# Patient Record
Sex: FEMALE | Race: BLACK OR AFRICAN AMERICAN | NOT HISPANIC OR LATINO | Employment: STUDENT | URBAN - METROPOLITAN AREA
[De-identification: names, ages, dates, MRNs, and addresses within clinical notes are randomized per-mention and may not be internally consistent; named-entity substitution may affect disease eponyms.]

---

## 2017-03-27 ENCOUNTER — TELEPHONE (OUTPATIENT)
Dept: OBSTETRICS AND GYNECOLOGY | Facility: CLINIC | Age: 62
End: 2017-03-27

## 2017-03-27 DIAGNOSIS — Z12.31 ENCOUNTER FOR SCREENING MAMMOGRAM FOR BREAST CANCER: Primary | ICD-10-CM

## 2017-03-27 NOTE — TELEPHONE ENCOUNTER
Per patient's request she has been scheduled for a mammogram. Patient is aware of appointment date, time and location.

## 2017-03-27 NOTE — TELEPHONE ENCOUNTER
----- Message from Manisha Rust sent at 3/27/2017 12:01 PM CDT -----  [facility] order.[phone] (home)

## 2017-05-10 ENCOUNTER — OFFICE VISIT (OUTPATIENT)
Dept: OPHTHALMOLOGY | Facility: CLINIC | Age: 62
End: 2017-05-10
Payer: COMMERCIAL

## 2017-05-10 DIAGNOSIS — H18.519 FUCHS' CORNEAL DYSTROPHY: ICD-10-CM

## 2017-05-10 DIAGNOSIS — H18.053 KRUKENBERG SPINDLE, BILATERAL: ICD-10-CM

## 2017-05-10 DIAGNOSIS — Z13.5 GLAUCOMA SCREENING: ICD-10-CM

## 2017-05-10 DIAGNOSIS — H52.7 REFRACTIVE ERROR: ICD-10-CM

## 2017-05-10 DIAGNOSIS — H40.003 GLAUCOMA SUSPECT, BILATERAL: Primary | ICD-10-CM

## 2017-05-10 PROCEDURE — 92015 DETERMINE REFRACTIVE STATE: CPT | Mod: S$GLB,,, | Performed by: OPTOMETRIST

## 2017-05-10 PROCEDURE — 92014 COMPRE OPH EXAM EST PT 1/>: CPT | Mod: S$GLB,,, | Performed by: OPTOMETRIST

## 2017-05-10 PROCEDURE — 99999 PR PBB SHADOW E&M-EST. PATIENT-LVL II: CPT | Mod: PBBFAC,,, | Performed by: OPTOMETRIST

## 2017-05-10 NOTE — PROGRESS NOTES
HPI     Last [facility] exam 04/25/2016  Glaucoma Suspect, Bilateral  Krukenberg spindle, bilateral  Fuchs' corneal dystrophy       Last edited by Ben Chowdhury MA on 5/10/2017  1:36 PM.         Assessment /Plan     For exam results, see Encounter Report.    Glaucoma suspect, bilateral    Fuchs' corneal dystrophy    Krukenberg spindle, bilateral    Cataract, nuclear, bilateral    Glaucoma screening    Refractive error      COAG suspect due to K-spindle and Fuch's and borderline IOP without change today = will follow. Mild NS OU = discussed and will follow.  OH OK OU otherwise.  Spec Rx given.  RTC one year.

## 2017-05-10 NOTE — MR AVS SNAPSHOT
[facility]  [address] [address] [place] [address]  Phone: [phone]  Fax: [phone]                  Caro Purvis   5/10/2017 1:15 PM   Office Visit    Description:  Female : 1955   Provider:  IDALIA Grady OD   Department:  [facility]           Reason for Visit     Hypertensive Eye Exam           Diagnoses this Visit        Comments    Glaucoma suspect, bilateral    -  Primary     Fuchs' corneal dystrophy         Krukenberg spindle, bilateral         Cataract, nuclear, bilateral         Glaucoma screening         Refractive error                To Do List           Future Appointments        Provider Department Dept Phone    2017 10:45 AM SUMNILESH [id number] [facility] [phone]    2017 11:15 AM Rahel Gamble MD [facility]/ [phone]      Goals (5 Years of Data)     None      Follow-Up and Disposition     Return in about 1 year (around 5/10/2018).      [facility]     [facility] -  Assistance  Unless otherwise directed by your provider, please contact [facility], our nurse care line that is available for  assistance.     Registered nurses in the [facility] provide: appointment scheduling, clinical advisement, health education, and other advisory services.  Call: [phone] (toll free)               Medications           Message regarding Medications     Verify the changes and/or additions to your medication regime listed below are the same as discussed with your clinician today.  If any of these changes or additions are incorrect, please notify your healthcare provider.             Verify that the below list of medications is an accurate representation of the medications you are currently taking.  If none reported, the list may be blank. If incorrect, please contact your healthcare provider. Carry this list with you in case of emergency.           Current Medications      amlodipine (NORVASC) 10 MG tablet Take 1 tablet (10 mg total) by mouth once daily.    cloNIDine (CATAPRES) 0.3 MG tablet Take 1 tablet (0.3 mg total) by mouth every evening.    furosemide (LASIX) 40 MG tablet Take 1 tablet (40 mg total) by mouth once daily.    hydrocortisone valerate (WESTCORT) 0.2 % ointment Apply 1 applicator topically Twice daily as needed. AAA bid       lisinopril (PRINIVIL,ZESTRIL) 40 MG tablet Take 1 tablet (40 mg total) by mouth once daily.    multivitamin-iron-folic acid (CENTRUM ULTRA WOMEN'S)  mg-mcg Tab Take 1 tablet by mouth Daily.    potassium chloride SA (K-DUR,KLOR-CON) 20 MEQ tablet Take 1 tablet by mouth  daily    pravastatin (PRAVACHOL) 40 MG tablet Take 1 tablet (40 mg total) by mouth once daily.           Clinical Reference Information           Your Vitals Were     Last Period                   07/15/2013           Allergies as of 5/10/2017     No Known Allergies      Immunizations Administered on Date of Encounter - 5/10/2017     None      Language Assistance Services     ATTENTION: Language assistance services are available, free of charge. Please call [phone].      ATENCIÓN: Si habla kasi, tiene a marmolejo disposición servicios gratuitos de asistencia lingüística. Llame al [phone].     [facility] Ý: N?u b?n nói Ti?ng Vi?t, có các d?ch v? h? tr? ngôn ng? mi?n phí dành cho b?n. G?i s? [phone].         [facility] complies with applicable Federal civil rights laws and does not discriminate on the basis of race, color, national origin, age, disability, or sex.

## 2017-05-16 DIAGNOSIS — I10 ESSENTIAL HYPERTENSION: ICD-10-CM

## 2017-05-16 RX ORDER — AMLODIPINE BESYLATE 10 MG/1
TABLET ORAL
Qty: 30 TABLET | Refills: 5 | Status: SHIPPED | OUTPATIENT
Start: 2017-05-16 | End: 2017-11-27 | Stop reason: SDUPTHER

## 2017-05-18 ENCOUNTER — HOSPITAL ENCOUNTER (OUTPATIENT)
Dept: RADIOLOGY | Facility: HOSPITAL | Age: 62
Discharge: HOME | End: 2017-05-18
Attending: OBSTETRICS & GYNECOLOGY
Payer: COMMERCIAL

## 2017-05-18 ENCOUNTER — OFFICE VISIT (OUTPATIENT)
Dept: OBSTETRICS AND GYNECOLOGY | Facility: CLINIC | Age: 62
End: 2017-05-18
Payer: COMMERCIAL

## 2017-05-18 VITALS
SYSTOLIC BLOOD PRESSURE: 150 MMHG | BODY MASS INDEX: 36.37 KG/M2 | DIASTOLIC BLOOD PRESSURE: 90 MMHG | WEIGHT: 240 LBS | HEIGHT: 68 IN

## 2017-05-18 DIAGNOSIS — Z12.31 ENCOUNTER FOR SCREENING MAMMOGRAM FOR BREAST CANCER: ICD-10-CM

## 2017-05-18 DIAGNOSIS — Z01.419 WELL WOMAN EXAM WITH ROUTINE GYNECOLOGICAL EXAM: Primary | ICD-10-CM

## 2017-05-18 PROCEDURE — 77067 SCR MAMMO BI INCL CAD: CPT | Mod: TC

## 2017-05-18 PROCEDURE — 3077F SYST BP >= 140 MM HG: CPT | Mod: S$GLB,,, | Performed by: OBSTETRICS & GYNECOLOGY

## 2017-05-18 PROCEDURE — 3080F DIAST BP >= 90 MM HG: CPT | Mod: S$GLB,,, | Performed by: OBSTETRICS & GYNECOLOGY

## 2017-05-18 PROCEDURE — 88142 CYTOPATH C/V THIN LAYER: CPT

## 2017-05-18 PROCEDURE — 99999 PR PBB SHADOW E&M-EST. PATIENT-LVL II: CPT | Mod: PBBFAC,,, | Performed by: OBSTETRICS & GYNECOLOGY

## 2017-05-18 PROCEDURE — 99396 PREV VISIT EST AGE 40-64: CPT | Mod: S$GLB,,, | Performed by: OBSTETRICS & GYNECOLOGY

## 2017-05-18 PROCEDURE — 77067 SCR MAMMO BI INCL CAD: CPT | Mod: 26,,, | Performed by: RADIOLOGY

## 2017-05-18 RX ORDER — NEBIVOLOL HYDROCHLORIDE 20 MG/1
TABLET ORAL
COMMUNITY
Start: 2017-04-19 | End: 2018-12-13

## 2017-05-18 NOTE — PROGRESS NOTES
CHIEF COMPLAINT:   Gynecologic Exam  Chief Complaint   Patient presents with    Gynecologic Exam       HISTORY OF PRESENT ILLNESS  Patient presents for annual exam. The patient has no complaints today.     Patient's last menstrual period was 07/15/2013.  Postmenopausal    GYN screening history: last Pap: was normal. Never had any abnormal Pap smears in past     Reports no bowel movement irregularities from baseline, bloating, or weight loss.    HRT:   None        Health Maintenance   Topic Date Due    TETANUS VACCINE  10/29/1973    Zoster Vaccine  10/29/2015    Mammogram  11/12/2016    Influenza Vaccine  08/01/2017    Pap Smear  08/20/2017    Lipid Panel  06/29/2020    Colonoscopy  10/03/2024    Hepatitis C Screening  Completed       HISTORY  Patient Active Problem List   Diagnosis    Obesity    Osteoarthritis    Hyperlipemia    Colon cancer screening    Essential hypertension       Past Medical History:   Diagnosis Date    Essential hypertension, benign     Obesity     Other and unspecified hyperlipidemia     Uveitis        Past Surgical History:   Procedure Laterality Date    COLONOSCOPY      TUBAL LIGATION         Family History   Problem Relation Age of Onset    Hypertension Mother     Hypertension Father     Cataracts Father     Hypertension Sister     Hypertension Brother     Hypertension Sister     Hypertension Brother     Hypertension Brother     Hypertension Brother     Hypertension Brother     Breast cancer Neg Hx     Ovarian cancer Neg Hx     Deep vein thrombosis Neg Hx        Social History     Social History    Marital status:      Spouse name: Jerardo    Number of children: 4    Years of education: N/A     Occupational History    CNA/Student      Social History Main Topics    Smoking status: Never Smoker    Smokeless tobacco: Never Used    Alcohol use No    Drug use: No    Sexual activity: Yes     Partners: Male      Comment:      Other Topics Concern     None     Social History Narrative       Current Outpatient Prescriptions   Medication Sig Dispense Refill    amlodipine (NORVASC) 10 MG tablet TAKE ONE TABLET BY MOUTH ONCE DAILY 30 tablet 5    cloNIDine (CATAPRES) 0.3 MG tablet Take 1 tablet (0.3 mg total) by mouth every evening. 90 tablet 3    furosemide (LASIX) 40 MG tablet Take 1 tablet (40 mg total) by mouth once daily. 90 tablet 3    hydrocortisone valerate (WESTCORT) 0.2 % ointment Apply 1 applicator topically Twice daily as needed. AAA bid         lisinopril (PRINIVIL,ZESTRIL) 40 MG tablet Take 1 tablet (40 mg total) by mouth once daily. 90 tablet 3    multivitamin-iron-folic acid (CENTRUM ULTRA WOMEN'S)  mg-mcg Tab Take 1 tablet by mouth Daily.      potassium chloride SA (K-DUR,KLOR-CON) 20 MEQ tablet Take 1 tablet by mouth  daily 90 tablet 1    pravastatin (PRAVACHOL) 40 MG tablet Take 1 tablet (40 mg total) by mouth once daily. 90 tablet 3    BYSTOLIC 20 mg Tab        No current facility-administered medications for this visit.        Review of patient's allergies indicates:  No Known Allergies        PHYSICAL EXAM     Vitals:    05/18/17 1119   BP: (!) 150/90     Repeat 140/88    PAIN SCALE: 0/10 None    ROS:  GENERAL: No fever, chills, fatigability or weight loss.  ABDOMEN: Appetite fine. No weight loss. Denies diarrhea, abdominal pain, hematemesis or blood in stool.  No change in bowel movement pattern.  URINARY: No flank pain, dysuria or hematuria.  REPRODUCTIVE: No abnormal vaginal bleeding.  BREASTS: Breasts symmetric, nontender and no lumps detected.    PE:   APPEARANCE: Well nourished, well developed, in no acute distress.  NECK: Neck symmetric without masses or thyromegaly.    NODES: No inguinal lymph node enlargement.  ABDOMEN: Soft. No tenderness or masses. No hepatosplenomegaly. No hernias.  BREASTS: Symmetrical, no skin changes or visible lesions. No palpable masses, nipple discharge or adenopathy bilaterally.    PELVIC:    VULVA: No lesions.  Atrophic but normal female genitalia.  URETHRAL MEATUS: Normal size and location, no lesions, no prolapse.  URETHRA: No masses, tenderness, prolapse or scarring.  VAGINA: dry and narrow w/ atrophy, no discharge, no significant cystocele or rectocele.  CERVIX: No lesions, normal diameter, no stenosis, no cervical motion tenderness.  UTERUS: 8 week size, regular shape, mobile, non-tender, normal position, good support.  ADNEXA: No masses, tenderness or CDS nodularity.  ANUS PERINEUM: No lesions, no relaxation, external hemorrhoids noted.         DIAGNOSIS:   1. Normal gyn exam  2. Screening pap smear  3. Physiologic atrophy    PLAN:   Mammogram    Colonoscopy  dexa     MEDICATIONS PRESCRIBED:   Calcium vitamin D and weight bearing exercise    COUNSELING:  Patient was counseled today on A.C.S. Pap guidelines and recommendations for yearly pelvic exams, mammograms and monthly self breast exams; to see her PCP for other health maintenance. F/u pcp for bp.    FOLLOW-UP: With me in 1 year.

## 2017-08-10 DIAGNOSIS — E78.5 HYPERLIPEMIA: ICD-10-CM

## 2017-08-10 RX ORDER — PRAVASTATIN SODIUM 40 MG/1
TABLET ORAL
Qty: 30 TABLET | Refills: 11 | Status: SHIPPED | OUTPATIENT
Start: 2017-08-10 | End: 2018-10-14 | Stop reason: SDUPTHER

## 2017-11-27 DIAGNOSIS — I10 ESSENTIAL HYPERTENSION: ICD-10-CM

## 2017-11-28 RX ORDER — AMLODIPINE BESYLATE 10 MG/1
TABLET ORAL
Qty: 30 TABLET | Refills: 5 | Status: SHIPPED | OUTPATIENT
Start: 2017-11-28 | End: 2018-06-03 | Stop reason: SDUPTHER

## 2018-06-03 DIAGNOSIS — I10 ESSENTIAL HYPERTENSION: ICD-10-CM

## 2018-06-04 RX ORDER — AMLODIPINE BESYLATE 10 MG/1
TABLET ORAL
Qty: 30 TABLET | Refills: 5 | Status: SHIPPED | OUTPATIENT
Start: 2018-06-04 | End: 2018-09-26 | Stop reason: SDUPTHER

## 2018-08-13 ENCOUNTER — TELEPHONE (OUTPATIENT)
Dept: OBSTETRICS AND GYNECOLOGY | Facility: CLINIC | Age: 63
End: 2018-08-13

## 2018-08-13 DIAGNOSIS — Z12.39 BREAST CANCER SCREENING: Primary | ICD-10-CM

## 2018-08-13 NOTE — TELEPHONE ENCOUNTER
Spoke with pt. Scheduled mammo for same day as annual with Dr. Gamble at the [facility] location. Pt verbalized understanding.

## 2018-08-13 NOTE — TELEPHONE ENCOUNTER
----- Message from Soheila Casey sent at 8/13/2018  4:46 PM CDT -----  Contact: pt  Pt stated she calling for orders for a mammogram, she can be reached at [phone]

## 2018-09-26 DIAGNOSIS — I10 ESSENTIAL HYPERTENSION: ICD-10-CM

## 2018-09-26 RX ORDER — AMLODIPINE BESYLATE 10 MG/1
TABLET ORAL
Qty: 90 TABLET | Refills: 1 | Status: SHIPPED | OUTPATIENT
Start: 2018-09-26 | End: 2019-03-30 | Stop reason: SDUPTHER

## 2018-10-14 DIAGNOSIS — E78.5 HYPERLIPEMIA: ICD-10-CM

## 2018-10-15 RX ORDER — PRAVASTATIN SODIUM 40 MG/1
TABLET ORAL
Qty: 30 TABLET | Refills: 11 | Status: SHIPPED | OUTPATIENT
Start: 2018-10-15

## 2018-10-31 ENCOUNTER — HOSPITAL ENCOUNTER (OUTPATIENT)
Dept: RADIOLOGY | Facility: HOSPITAL | Age: 63
Discharge: HOME | End: 2018-10-31
Attending: OBSTETRICS & GYNECOLOGY
Payer: COMMERCIAL

## 2018-10-31 VITALS — BODY MASS INDEX: 36.37 KG/M2 | WEIGHT: 240 LBS | HEIGHT: 68 IN

## 2018-10-31 DIAGNOSIS — Z12.39 BREAST CANCER SCREENING: ICD-10-CM

## 2018-10-31 PROCEDURE — 77063 BREAST TOMOSYNTHESIS BI: CPT | Mod: 26,,, | Performed by: RADIOLOGY

## 2018-10-31 PROCEDURE — 77063 BREAST TOMOSYNTHESIS BI: CPT | Mod: TC,PO

## 2018-10-31 PROCEDURE — 77067 SCR MAMMO BI INCL CAD: CPT | Mod: 26,,, | Performed by: RADIOLOGY

## 2018-12-13 ENCOUNTER — OFFICE VISIT (OUTPATIENT)
Dept: OBSTETRICS AND GYNECOLOGY | Facility: CLINIC | Age: 63
End: 2018-12-13
Payer: COMMERCIAL

## 2018-12-13 VITALS
DIASTOLIC BLOOD PRESSURE: 76 MMHG | BODY MASS INDEX: 34.15 KG/M2 | HEIGHT: 68 IN | WEIGHT: 225.31 LBS | SYSTOLIC BLOOD PRESSURE: 156 MMHG

## 2018-12-13 DIAGNOSIS — Z01.419 WELL WOMAN EXAM WITH ROUTINE GYNECOLOGICAL EXAM: Primary | ICD-10-CM

## 2018-12-13 PROCEDURE — 99396 PREV VISIT EST AGE 40-64: CPT | Mod: S$GLB,,, | Performed by: OBSTETRICS & GYNECOLOGY

## 2018-12-13 PROCEDURE — 99999 PR PBB SHADOW E&M-EST. PATIENT-LVL II: CPT | Mod: PBBFAC,,, | Performed by: OBSTETRICS & GYNECOLOGY

## 2018-12-13 PROCEDURE — 3078F DIAST BP <80 MM HG: CPT | Mod: CPTII,S$GLB,, | Performed by: OBSTETRICS & GYNECOLOGY

## 2018-12-13 PROCEDURE — 3077F SYST BP >= 140 MM HG: CPT | Mod: CPTII,S$GLB,, | Performed by: OBSTETRICS & GYNECOLOGY

## 2018-12-13 RX ORDER — METFORMIN HYDROCHLORIDE 500 MG/1
500 TABLET ORAL 2 TIMES DAILY WITH MEALS
COMMUNITY

## 2018-12-13 NOTE — PROGRESS NOTES
CHIEF COMPLAINT:   Gynecologic Exam  Chief Complaint   Patient presents with    Well Woman       HISTORY OF PRESENT ILLNESS  Patient presents for annual exam. The patient has no complaints today.     Patient's last menstrual period was 07/15/2013.  Postmenopausal    GYN screening history: last Pap: was normal. Never had any abnormal Pap smears in past.     Reports no bowel movement irregularities from baseline, bloating, or weight loss.    HRT:   None         Health Maintenance   Topic Date Due    TETANUS VACCINE  10/29/1973    Zoster Vaccine  10/29/2015    Influenza Vaccine  08/01/2018    Mammogram  10/31/2019    Pap Smear with HPV Cotest  05/18/2020    Lipid Panel  06/29/2020    Colonoscopy  10/03/2024    Hepatitis C Screening  Completed       HISTORY  Patient Active Problem List   Diagnosis    Obesity    Osteoarthritis    Hyperlipemia    Colon cancer screening    Essential hypertension       Past Medical History:   Diagnosis Date    Essential hypertension, benign     Obesity     Other and unspecified hyperlipidemia     Uveitis        Past Surgical History:   Procedure Laterality Date    COLONOSCOPY      COLONOSCOPY N/A 10/3/2014    [facility]    TUBAL LIGATION         Family History   Problem Relation Age of Onset    Hypertension Mother     Hypertension Father     Cataracts Father     Hypertension Sister     Hypertension Brother     Hypertension Sister     Hypertension Brother     Hypertension Brother     Hypertension Brother     Hypertension Brother     Breast cancer Neg Hx     Ovarian cancer Neg Hx     Deep vein thrombosis Neg Hx        Social History     Socioeconomic History    Marital status:      Spouse name: Jerardo    Number of children: 4    Years of education: None    Highest education level: None   Social Needs    Financial resource strain: None    Food insecurity - worry: None    Food insecurity - inability: None     Transportation needs - medical: None    Transportation needs - non-medical: None   Occupational History    Occupation: CNA/Student   Tobacco Use    Smoking status: Never Smoker    Smokeless tobacco: Never Used   Substance and Sexual Activity    Alcohol use: No    Drug use: No    Sexual activity: No     Partners: Male     Comment:    Other Topics Concern    None   Social History Narrative    None       Current Outpatient Medications   Medication Sig Dispense Refill    amLODIPine (NORVASC) 10 MG tablet TAKE 1 TABLET BY MOUTH ONCE DAILY 90 tablet 1    cloNIDine (CATAPRES) 0.3 MG tablet Take 1 tablet (0.3 mg total) by mouth every evening. 90 tablet 3    furosemide (LASIX) 40 MG tablet Take 1 tablet (40 mg total) by mouth once daily. 90 tablet 3    hydrocortisone valerate (WESTCORT) 0.2 % ointment Apply 1 applicator topically Twice daily as needed. AAA bid         lisinopril (PRINIVIL,ZESTRIL) 40 MG tablet Take 1 tablet (40 mg total) by mouth once daily. 90 tablet 3    metFORMIN (GLUCOPHAGE) 500 MG tablet Take 500 mg by mouth 2 (two) times daily with meals.      multivitamin-iron-folic acid (CENTRUM ULTRA WOMEN'S)  mg-mcg Tab Take 1 tablet by mouth Daily.      potassium chloride SA (K-DUR,KLOR-CON) 20 MEQ tablet Take 1 tablet by mouth  daily 90 tablet 1    pravastatin (PRAVACHOL) 40 MG tablet TAKE ONE TABLET BY MOUTH ONCE DAILY 30 tablet 11     No current facility-administered medications for this visit.        Review of patient's allergies indicates:  No Known Allergies        PHYSICAL EXAM     Vitals:    12/13/18 1045   BP: (!) 156/76   repeat 144/80    PAIN SCALE: 0/10 None    ROS:  GENERAL: No fever, chills, fatigability or weight loss.  ABDOMEN: Appetite fine. No weight loss. Denies diarrhea, abdominal pain, hematemesis or blood in stool.  No change in bowel movement pattern.  URINARY: No flank pain, dysuria or hematuria.  REPRODUCTIVE: No abnormal vaginal bleeding.  BREASTS: Breasts  symmetric, nontender and no lumps detected.    PE:   APPEARANCE: Well nourished, well developed, in no acute distress.  NECK: Neck symmetric without masses or thyromegaly.   NODES: No inguinal lymph node enlargement.  ABDOMEN: Soft. No tenderness or masses. No hepatosplenomegaly. No hernias.  BREASTS: Symmetrical, no skin changes or visible lesions. No palpable masses, nipple discharge or adenopathy bilaterally.    PELVIC:   VULVA: No lesions.  Atrophic but normal female genitalia.  URETHRAL MEATUS: Normal size and location, no lesions, no prolapse.  URETHRA: No masses, tenderness, prolapse or scarring.  VAGINA: dry and narrow w/ atrophy, no discharge, no significant cystocele or rectocele.  CERVIX: No lesions, normal diameter, no stenosis, no cervical motion tenderness.  UTERUS: 8 week size, regular shape, mobile, non-tender, normal position, good support.  ADNEXA: No masses, tenderness or CDS nodularity.  ANUS PERINEUM: No lesions, no relaxation, external hemorrhoids noted.       DIAGNOSIS:   1. Normal gyn exam  2. Physiologic atrophy    PLAN:   Mammogram    Colonoscopy  dexa       MEDICATIONS PRESCRIBED:   Calcium vitamin D and weight bearing exercise    COUNSELING:  Patient was counseled today on A.C.S. Pap guidelines and recommendations for yearly pelvic exams, mammograms and monthly self breast exams; to see her PCP for other health maintenance.     FOLLOW-UP: With me in 1 year.

## 2019-03-30 DIAGNOSIS — I10 ESSENTIAL HYPERTENSION: ICD-10-CM

## 2019-04-01 RX ORDER — AMLODIPINE BESYLATE 10 MG/1
TABLET ORAL
Qty: 90 TABLET | Refills: 3 | Status: SHIPPED | OUTPATIENT
Start: 2019-04-01 | End: 2020-03-11

## 2019-12-09 ENCOUNTER — TELEPHONE (OUTPATIENT)
Dept: OBSTETRICS AND GYNECOLOGY | Facility: CLINIC | Age: 64
End: 2019-12-09

## 2019-12-09 DIAGNOSIS — Z12.31 BREAST CANCER SCREENING BY MAMMOGRAM: Primary | ICD-10-CM

## 2019-12-09 NOTE — TELEPHONE ENCOUNTER
----- Message from Lazara Chowdhury sent at 12/9/2019 11:48 AM CST -----  Contact: pt   Type:  Mammogram    Caller is requesting to schedule their annual mammogram appointment.  Order is not listed in EPIC.  Please enter order and contact patient to schedule.  Name of Caller:Caro Purvis   Where would they like the mammogram performed? [facility]   Would the patient rather a call back or a response via [facility]? Call back   Best Call Back Number: [phone] (home)   Additional Information:

## 2019-12-09 NOTE — TELEPHONE ENCOUNTER
Returned call to patient.  She requested a mammo and an annual appt at [facility].  Appts scheduled 01/16/20 at 7:20 am for mammo and 7:45 am for annual.  She confirmed appts, location and mammo instructions.  Says that she will arrive for 7:00 am for mammo, per patient.

## 2020-01-16 ENCOUNTER — HOSPITAL ENCOUNTER (OUTPATIENT)
Dept: RADIOLOGY | Facility: HOSPITAL | Age: 65
Discharge: HOME | End: 2020-01-16
Attending: OBSTETRICS & GYNECOLOGY
Payer: COMMERCIAL

## 2020-01-16 VITALS — BODY MASS INDEX: 34.15 KG/M2 | HEIGHT: 68 IN | WEIGHT: 225.31 LBS

## 2020-01-16 DIAGNOSIS — Z12.31 BREAST CANCER SCREENING BY MAMMOGRAM: ICD-10-CM

## 2020-01-16 PROCEDURE — 77067 MAMMO DIGITAL SCREENING BILAT WITH TOMOSYNTHESIS_CAD: ICD-10-PCS | Mod: 26,,, | Performed by: RADIOLOGY

## 2020-01-16 PROCEDURE — 77063 MAMMO DIGITAL SCREENING BILAT WITH TOMOSYNTHESIS_CAD: ICD-10-PCS | Mod: 26,,, | Performed by: RADIOLOGY

## 2020-01-16 PROCEDURE — 77067 SCR MAMMO BI INCL CAD: CPT | Mod: TC

## 2020-01-16 PROCEDURE — 77067 SCR MAMMO BI INCL CAD: CPT | Mod: 26,,, | Performed by: RADIOLOGY

## 2020-01-16 PROCEDURE — 77063 BREAST TOMOSYNTHESIS BI: CPT | Mod: 26,,, | Performed by: RADIOLOGY

## 2020-03-10 DIAGNOSIS — I10 ESSENTIAL HYPERTENSION: ICD-10-CM

## 2020-03-11 RX ORDER — AMLODIPINE BESYLATE 10 MG/1
TABLET ORAL
Qty: 90 TABLET | Refills: 3 | Status: SHIPPED | OUTPATIENT
Start: 2020-03-11 | End: 2021-03-14 | Stop reason: SDUPTHER

## 2020-11-17 ENCOUNTER — TELEPHONE (OUTPATIENT)
Dept: OBSTETRICS AND GYNECOLOGY | Facility: CLINIC | Age: 65
End: 2020-11-17

## 2020-11-17 NOTE — TELEPHONE ENCOUNTER
----- Message from Idalia Thompson sent at 11/17/2020  1:15 PM CST -----  Regarding: sooner appt  Contact: pt  Type:  Sooner Apoointment Request    Caller is requesting a sooner appointment.  Caller declined first available appointment listed below.  Caller will not accept being placed on the waitlist and is requesting a message be sent to doctor.  Name of Caller : pt   When is the first available appointment?12/24/2020  Symptoms:n/a  Would the patient rather a call back or a response via [facility]? Call back  Best Call Back Number:[phone]  Additional Information: annual

## 2021-01-14 ENCOUNTER — OFFICE VISIT (OUTPATIENT)
Dept: OBSTETRICS AND GYNECOLOGY | Facility: CLINIC | Age: 66
End: 2021-01-14
Payer: MEDICAID

## 2021-01-14 VITALS
BODY MASS INDEX: 32.84 KG/M2 | HEIGHT: 68 IN | DIASTOLIC BLOOD PRESSURE: 78 MMHG | SYSTOLIC BLOOD PRESSURE: 144 MMHG | WEIGHT: 216.69 LBS

## 2021-01-14 DIAGNOSIS — Z12.31 BREAST CANCER SCREENING BY MAMMOGRAM: Primary | ICD-10-CM

## 2021-01-14 DIAGNOSIS — Z01.419 WELL WOMAN EXAM WITH ROUTINE GYNECOLOGICAL EXAM: ICD-10-CM

## 2021-01-14 PROCEDURE — 87624 HPV HI-RISK TYP POOLED RSLT: CPT

## 2021-01-14 PROCEDURE — 88141 PR  CYTOPATH CERV/VAG INTERPRET: ICD-10-PCS | Mod: ,,, | Performed by: PATHOLOGY

## 2021-01-14 PROCEDURE — 99999 PR PBB SHADOW E&M-EST. PATIENT-LVL IV: CPT | Mod: PBBFAC,,, | Performed by: OBSTETRICS & GYNECOLOGY

## 2021-01-14 PROCEDURE — 99397 PER PM REEVAL EST PAT 65+ YR: CPT | Mod: S$PBB,,, | Performed by: OBSTETRICS & GYNECOLOGY

## 2021-01-14 PROCEDURE — 99214 OFFICE O/P EST MOD 30 MIN: CPT | Mod: PBBFAC | Performed by: OBSTETRICS & GYNECOLOGY

## 2021-01-14 PROCEDURE — 99999 PR PBB SHADOW E&M-EST. PATIENT-LVL IV: ICD-10-PCS | Mod: PBBFAC,,, | Performed by: OBSTETRICS & GYNECOLOGY

## 2021-01-14 PROCEDURE — 88141 CYTOPATH C/V INTERPRET: CPT | Mod: ,,, | Performed by: PATHOLOGY

## 2021-01-14 PROCEDURE — 88175 CYTOPATH C/V AUTO FLUID REDO: CPT | Performed by: PATHOLOGY

## 2021-01-14 PROCEDURE — 99397 PR PREVENTIVE VISIT,EST,65 & OVER: ICD-10-PCS | Mod: S$PBB,,, | Performed by: OBSTETRICS & GYNECOLOGY

## 2021-01-14 RX ORDER — BRIMONIDINE TARTRATE, TIMOLOL MALEATE 2; 5 MG/ML; MG/ML
SOLUTION/ DROPS OPHTHALMIC
COMMUNITY
Start: 2020-12-20

## 2021-01-14 RX ORDER — CYCLOSPORINE 0.5 MG/ML
EMULSION OPHTHALMIC
COMMUNITY
Start: 2020-12-17

## 2021-01-14 RX ORDER — LEVOCETIRIZINE DIHYDROCHLORIDE 5 MG/1
5 TABLET, FILM COATED ORAL DAILY
COMMUNITY
Start: 2020-12-14

## 2021-01-14 RX ORDER — NAPROXEN SODIUM 220 MG/1
TABLET, FILM COATED ORAL
COMMUNITY

## 2021-01-14 RX ORDER — METOPROLOL SUCCINATE 25 MG/1
25 TABLET, EXTENDED RELEASE ORAL DAILY
COMMUNITY
Start: 2020-11-16

## 2021-01-14 RX ORDER — ERGOCALCIFEROL 1.25 MG/1
50000 CAPSULE ORAL
COMMUNITY
Start: 2020-12-20

## 2021-01-14 RX ORDER — FLUTICASONE PROPIONATE 50 MCG
SPRAY, SUSPENSION (ML) NASAL
COMMUNITY

## 2021-01-25 LAB
HPV HR 12 DNA SPEC QL NAA+PROBE: NEGATIVE
HPV16 AG SPEC QL: NEGATIVE
HPV18 DNA SPEC QL NAA+PROBE: NEGATIVE

## 2021-01-28 ENCOUNTER — HOSPITAL ENCOUNTER (OUTPATIENT)
Dept: RADIOLOGY | Facility: HOSPITAL | Age: 66
Discharge: HOME | End: 2021-01-28
Attending: OBSTETRICS & GYNECOLOGY
Payer: MEDICAID

## 2021-01-28 DIAGNOSIS — Z12.31 BREAST CANCER SCREENING BY MAMMOGRAM: ICD-10-CM

## 2021-01-28 PROCEDURE — 77063 MAMMO DIGITAL SCREENING BILAT WITH TOMO: ICD-10-PCS | Mod: 26,,, | Performed by: RADIOLOGY

## 2021-01-28 PROCEDURE — 77067 MAMMO DIGITAL SCREENING BILAT WITH TOMO: ICD-10-PCS | Mod: 26,,, | Performed by: RADIOLOGY

## 2021-01-28 PROCEDURE — 77067 SCR MAMMO BI INCL CAD: CPT | Mod: TC

## 2021-01-28 PROCEDURE — 77063 BREAST TOMOSYNTHESIS BI: CPT | Mod: 26,,, | Performed by: RADIOLOGY

## 2021-01-28 PROCEDURE — 77067 SCR MAMMO BI INCL CAD: CPT | Mod: 26,,, | Performed by: RADIOLOGY

## 2021-02-11 LAB
FINAL PATHOLOGIC DIAGNOSIS: NORMAL
Lab: NORMAL

## 2021-03-14 DIAGNOSIS — I10 ESSENTIAL HYPERTENSION: ICD-10-CM

## 2021-03-14 RX ORDER — AMLODIPINE BESYLATE 10 MG/1
TABLET ORAL
Qty: 90 TABLET | Refills: 0 | OUTPATIENT
Start: 2021-03-14

## 2021-03-14 RX ORDER — AMLODIPINE BESYLATE 10 MG/1
10 TABLET ORAL DAILY
Qty: 30 TABLET | Refills: 0 | Status: SHIPPED | OUTPATIENT
Start: 2021-03-14 | End: 2021-04-09

## 2021-03-16 DIAGNOSIS — Z76.89 ESTABLISHING CARE WITH NEW DOCTOR, ENCOUNTER FOR: Primary | ICD-10-CM

## 2021-03-16 DIAGNOSIS — E78.5 HYPERLIPIDEMIA, UNSPECIFIED HYPERLIPIDEMIA TYPE: ICD-10-CM

## 2021-03-16 DIAGNOSIS — I10 ESSENTIAL HYPERTENSION: ICD-10-CM

## 2021-03-25 ENCOUNTER — OFFICE VISIT (OUTPATIENT)
Dept: CARDIOLOGY | Facility: CLINIC | Age: 66
End: 2021-03-25
Payer: MEDICAID

## 2021-03-25 ENCOUNTER — HOSPITAL ENCOUNTER (OUTPATIENT)
Dept: CARDIOLOGY | Facility: HOSPITAL | Age: 66
Discharge: HOME | End: 2021-03-25
Attending: INTERNAL MEDICINE
Payer: MEDICAID

## 2021-03-25 VITALS
DIASTOLIC BLOOD PRESSURE: 86 MMHG | HEART RATE: 70 BPM | BODY MASS INDEX: 32.84 KG/M2 | SYSTOLIC BLOOD PRESSURE: 150 MMHG | WEIGHT: 216 LBS | OXYGEN SATURATION: 99 %

## 2021-03-25 DIAGNOSIS — I10 ESSENTIAL HYPERTENSION: ICD-10-CM

## 2021-03-25 DIAGNOSIS — E78.5 HYPERLIPIDEMIA, UNSPECIFIED HYPERLIPIDEMIA TYPE: ICD-10-CM

## 2021-03-25 DIAGNOSIS — I73.9 PVD (PERIPHERAL VASCULAR DISEASE): ICD-10-CM

## 2021-03-25 DIAGNOSIS — E78.2 MIXED HYPERLIPIDEMIA: ICD-10-CM

## 2021-03-25 DIAGNOSIS — I10 ESSENTIAL HYPERTENSION: Primary | ICD-10-CM

## 2021-03-25 DIAGNOSIS — I51.7 LVH (LEFT VENTRICULAR HYPERTROPHY): ICD-10-CM

## 2021-03-25 DIAGNOSIS — Z76.89 ESTABLISHING CARE WITH NEW DOCTOR, ENCOUNTER FOR: ICD-10-CM

## 2021-03-25 PROCEDURE — 93005 ELECTROCARDIOGRAM TRACING: CPT

## 2021-03-25 PROCEDURE — 93010 EKG 12-LEAD: ICD-10-PCS | Mod: ,,, | Performed by: INTERNAL MEDICINE

## 2021-03-25 PROCEDURE — 93010 ELECTROCARDIOGRAM REPORT: CPT | Mod: ,,, | Performed by: INTERNAL MEDICINE

## 2021-03-25 PROCEDURE — 99213 OFFICE O/P EST LOW 20 MIN: CPT | Mod: PBBFAC | Performed by: INTERNAL MEDICINE

## 2021-03-25 PROCEDURE — 99204 PR OFFICE/OUTPT VISIT, NEW, LEVL IV, 45-59 MIN: ICD-10-PCS | Mod: S$PBB,25,, | Performed by: INTERNAL MEDICINE

## 2021-03-25 PROCEDURE — 99999 PR PBB SHADOW E&M-EST. PATIENT-LVL III: CPT | Mod: PBBFAC,,, | Performed by: INTERNAL MEDICINE

## 2021-03-25 PROCEDURE — 99999 PR PBB SHADOW E&M-EST. PATIENT-LVL III: ICD-10-PCS | Mod: PBBFAC,,, | Performed by: INTERNAL MEDICINE

## 2021-03-25 PROCEDURE — 99204 OFFICE O/P NEW MOD 45 MIN: CPT | Mod: S$PBB,25,, | Performed by: INTERNAL MEDICINE

## 2021-03-25 RX ORDER — HYDROCHLOROTHIAZIDE 25 MG/1
25 TABLET ORAL DAILY
Qty: 30 TABLET | Refills: 11 | Status: SHIPPED | OUTPATIENT
Start: 2021-03-25 | End: 2022-01-11 | Stop reason: SDUPTHER

## 2021-04-28 ENCOUNTER — PATIENT MESSAGE (OUTPATIENT)
Dept: RESEARCH | Facility: HOSPITAL | Age: 66
End: 2021-04-28

## 2021-05-03 ENCOUNTER — HOSPITAL ENCOUNTER (OUTPATIENT)
Dept: CARDIOLOGY | Facility: HOSPITAL | Age: 66
Discharge: HOME | End: 2021-05-03
Attending: INTERNAL MEDICINE
Payer: MEDICAID

## 2021-05-03 VITALS — HEIGHT: 68 IN | WEIGHT: 216 LBS | BODY MASS INDEX: 32.74 KG/M2

## 2021-05-03 DIAGNOSIS — E78.2 MIXED HYPERLIPIDEMIA: ICD-10-CM

## 2021-05-03 DIAGNOSIS — I51.7 LVH (LEFT VENTRICULAR HYPERTROPHY): ICD-10-CM

## 2021-05-03 DIAGNOSIS — I10 ESSENTIAL HYPERTENSION: ICD-10-CM

## 2021-05-03 LAB
AORTIC ROOT ANNULUS: 3.03 CM
AV INDEX (PROSTH): 0.77
AV MEAN GRADIENT: 3 MMHG
AV PEAK GRADIENT: 5 MMHG
AV VALVE AREA: 2.47 CM2
AV VELOCITY RATIO: 0.81
BSA FOR ECHO PROCEDURE: 2.17 M2
CV ECHO LV RWT: 0.56 CM
DOP CALC AO PEAK VEL: 1.13 M/S
DOP CALC AO VTI: 28.14 CM
DOP CALC LVOT AREA: 3.2 CM2
DOP CALC LVOT DIAMETER: 2.02 CM
DOP CALC LVOT PEAK VEL: 0.91 M/S
DOP CALC LVOT STROKE VOLUME: 69.51 CM3
DOP CALC RVOT PEAK VEL: 0.62 M/S
DOP CALC RVOT VTI: 13.47 CM
DOP CALCLVOT PEAK VEL VTI: 21.7 CM
E WAVE DECELERATION TIME: 177.23 MSEC
E/A RATIO: 1.09
E/E' RATIO: 8.22 M/S
ECHO LV POSTERIOR WALL: 1.2 CM (ref 0.6–1.1)
EJECTION FRACTION: 60 %
FRACTIONAL SHORTENING: 31 % (ref 28–44)
INTERVENTRICULAR SEPTUM: 1.28 CM (ref 0.6–1.1)
IVRT: 97.05 MSEC
LA MAJOR: 5.16 CM
LA MINOR: 5.31 CM
LA WIDTH: 4.28 CM
LEFT ATRIUM SIZE: 3.74 CM
LEFT ATRIUM VOLUME INDEX: 33.8 ML/M2
LEFT ATRIUM VOLUME: 71.21 CM3
LEFT INTERNAL DIMENSION IN SYSTOLE: 2.98 CM (ref 2.1–4)
LEFT VENTRICLE DIASTOLIC VOLUME INDEX: 39.77 ML/M2
LEFT VENTRICLE DIASTOLIC VOLUME: 83.91 ML
LEFT VENTRICLE MASS INDEX: 92 G/M2
LEFT VENTRICLE SYSTOLIC VOLUME INDEX: 16.3 ML/M2
LEFT VENTRICLE SYSTOLIC VOLUME: 34.45 ML
LEFT VENTRICULAR INTERNAL DIMENSION IN DIASTOLE: 4.32 CM (ref 3.5–6)
LEFT VENTRICULAR MASS: 195.13 G
LV LATERAL E/E' RATIO: 7.4 M/S
LV SEPTAL E/E' RATIO: 9.25 M/S
MV A" WAVE DURATION": 9.71 MSEC
MV PEAK A VEL: 0.68 M/S
MV PEAK E VEL: 0.74 M/S
PISA TR MAX VEL: 2.38 M/S
PULM VEIN S/D RATIO: 1.63
PV MEAN GRADIENT: 1 MMHG
PV PEAK D VEL: 0.49 M/S
PV PEAK S VEL: 0.8 M/S
PV PEAK VELOCITY: 0.73 CM/S
RA MAJOR: 5.56 CM
RA PRESSURE: 3 MMHG
RA WIDTH: 3.28 CM
RIGHT VENTRICULAR END-DIASTOLIC DIMENSION: 2.39 CM
SINUS: 2.64 CM
STJ: 2.29 CM
TDI LATERAL: 0.1 M/S
TDI SEPTAL: 0.08 M/S
TDI: 0.09 M/S
TR MAX PG: 23 MMHG
TRICUSPID ANNULAR PLANE SYSTOLIC EXCURSION: 2.46 CM
TV REST PULMONARY ARTERY PRESSURE: 26 MMHG

## 2021-05-03 PROCEDURE — 93306 TTE W/DOPPLER COMPLETE: CPT | Mod: 26,,, | Performed by: INTERNAL MEDICINE

## 2021-05-03 PROCEDURE — 93306 ECHO (CUPID ONLY): ICD-10-PCS | Mod: 26,,, | Performed by: INTERNAL MEDICINE

## 2021-05-03 PROCEDURE — 93306 TTE W/DOPPLER COMPLETE: CPT

## 2021-05-04 ENCOUNTER — TELEPHONE (OUTPATIENT)
Dept: CARDIOLOGY | Facility: CLINIC | Age: 66
End: 2021-05-04

## 2022-01-11 RX ORDER — HYDROCHLOROTHIAZIDE 25 MG/1
25 TABLET ORAL DAILY
Qty: 30 TABLET | Refills: 11 | Status: SHIPPED | OUTPATIENT
Start: 2022-01-11

## 2022-01-14 DIAGNOSIS — I10 ESSENTIAL HYPERTENSION: ICD-10-CM

## 2022-01-15 RX ORDER — AMLODIPINE BESYLATE 10 MG/1
10 TABLET ORAL DAILY
Qty: 30 TABLET | Refills: 9 | Status: SHIPPED | OUTPATIENT
Start: 2022-01-15

## 2022-03-04 DIAGNOSIS — I10 ESSENTIAL HYPERTENSION: Primary | ICD-10-CM

## 2022-03-09 ENCOUNTER — PATIENT OUTREACH (OUTPATIENT)
Dept: OBSTETRICS AND GYNECOLOGY | Facility: CLINIC | Age: 67
End: 2022-03-09
Payer: MEDICAID

## 2022-03-09 ENCOUNTER — TELEPHONE (OUTPATIENT)
Dept: OBSTETRICS AND GYNECOLOGY | Facility: CLINIC | Age: 67
End: 2022-03-09
Payer: MEDICAID

## 2022-03-09 DIAGNOSIS — Z12.31 ENCOUNTER FOR SCREENING MAMMOGRAM FOR MALIGNANT NEOPLASM OF BREAST: Primary | ICD-10-CM

## 2022-03-09 NOTE — TELEPHONE ENCOUNTER
----- Message from Alem Iglesias sent at 3/9/2022  2:46 PM CST -----  States she would like to schedule a mammogram and an appt w/ Ms REGINA Groves on the same day. Please call pt [phone]. Thank you

## 2022-03-10 ENCOUNTER — OFFICE VISIT (OUTPATIENT)
Dept: CARDIOLOGY | Facility: CLINIC | Age: 67
End: 2022-03-10
Payer: MEDICARE

## 2022-03-10 ENCOUNTER — HOSPITAL ENCOUNTER (OUTPATIENT)
Dept: CARDIOLOGY | Facility: HOSPITAL | Age: 67
Discharge: HOME | End: 2022-03-10
Attending: INTERNAL MEDICINE
Payer: MEDICARE

## 2022-03-10 VITALS
HEART RATE: 70 BPM | BODY MASS INDEX: 32.39 KG/M2 | OXYGEN SATURATION: 98 % | WEIGHT: 213 LBS | SYSTOLIC BLOOD PRESSURE: 136 MMHG | DIASTOLIC BLOOD PRESSURE: 80 MMHG

## 2022-03-10 DIAGNOSIS — I73.9 PVD (PERIPHERAL VASCULAR DISEASE): ICD-10-CM

## 2022-03-10 DIAGNOSIS — I10 ESSENTIAL HYPERTENSION: Primary | ICD-10-CM

## 2022-03-10 DIAGNOSIS — I10 ESSENTIAL HYPERTENSION: ICD-10-CM

## 2022-03-10 DIAGNOSIS — E78.2 MIXED HYPERLIPIDEMIA: ICD-10-CM

## 2022-03-10 PROCEDURE — 99213 OFFICE O/P EST LOW 20 MIN: CPT | Mod: PBBFAC | Performed by: INTERNAL MEDICINE

## 2022-03-10 PROCEDURE — 93010 ELECTROCARDIOGRAM REPORT: CPT | Mod: ,,, | Performed by: INTERNAL MEDICINE

## 2022-03-10 PROCEDURE — 99214 PR OFFICE/OUTPT VISIT, EST, LEVL IV, 30-39 MIN: ICD-10-PCS | Mod: S$PBB,,, | Performed by: INTERNAL MEDICINE

## 2022-03-10 PROCEDURE — 93005 ELECTROCARDIOGRAM TRACING: CPT

## 2022-03-10 PROCEDURE — 93010 EKG 12-LEAD: ICD-10-PCS | Mod: ,,, | Performed by: INTERNAL MEDICINE

## 2022-03-10 PROCEDURE — 99999 PR PBB SHADOW E&M-EST. PATIENT-LVL III: CPT | Mod: PBBFAC,,, | Performed by: INTERNAL MEDICINE

## 2022-03-10 PROCEDURE — 99214 OFFICE O/P EST MOD 30 MIN: CPT | Mod: S$PBB,,, | Performed by: INTERNAL MEDICINE

## 2022-03-10 PROCEDURE — 99999 PR PBB SHADOW E&M-EST. PATIENT-LVL III: ICD-10-PCS | Mod: PBBFAC,,, | Performed by: INTERNAL MEDICINE

## 2022-03-10 NOTE — PROGRESS NOTES
Subjective:   Patient ID:  Caro Purvis is a 66 y.o. female who presents for follow up of No chief complaint on file.      65 yo, female, came in for 1 yr f/u. Lives with   PMH DM < 5 yrs ago,  HTN, and HLD. No h/o heart attack, stroke and cancer  No chest pain, dyspnea, palpitation dizziness orthopnea and fainting.  Occasional leg swelling   Physically active  No smoking/drinking.  2016 ECHO Normal EF and LVH    Interval history   echo normal EF and mild MR/AI.   Today EKG NSR. Leg swelling resolved. BP controlled. F/u with Dr. Conrad  No chest pain dyspnea palpitation and orthopnea            Past Medical History:   Diagnosis Date    Essential hypertension, benign     Obesity     Other and unspecified hyperlipidemia     Uveitis        Past Surgical History:   Procedure Laterality Date    COLONOSCOPY      TUBAL LIGATION         Social History     Tobacco Use    Smoking status: Never Smoker    Smokeless tobacco: Never Used   Substance Use Topics    Alcohol use: No    Drug use: No       Family History   Problem Relation Age of Onset    Hypertension Mother     Hypertension Father     Cataracts Father     Hypertension Sister     Hypertension Brother     Hypertension Sister     Hypertension Brother     Hypertension Brother     Hypertension Brother     Hypertension Brother     Breast cancer Neg Hx     Ovarian cancer Neg Hx     Deep vein thrombosis Neg Hx          Review of Systems   Constitutional: Negative for decreased appetite, diaphoresis, fever, malaise/fatigue and night sweats.   HENT: Negative for nosebleeds.    Eyes: Negative for blurred vision and double vision.   Cardiovascular: Positive for leg swelling. Negative for chest pain, claudication, dyspnea on exertion, irregular heartbeat, near-syncope, orthopnea, palpitations, paroxysmal nocturnal dyspnea and syncope.   Respiratory: Negative for cough, shortness of breath, sleep disturbances due to breathing, snoring, sputum  production and wheezing.    Endocrine: Negative for cold intolerance and polyuria.   Hematologic/Lymphatic: Does not bruise/bleed easily.   Skin: Negative for rash.   Musculoskeletal: Negative for back pain, falls, joint pain, joint swelling and neck pain.   Gastrointestinal: Negative for abdominal pain, heartburn, nausea and vomiting.   Genitourinary: Negative for dysuria, frequency and hematuria.   Neurological: Negative for difficulty with concentration, dizziness, focal weakness, headaches, light-headedness, numbness, seizures and weakness.   Psychiatric/Behavioral: Negative for depression, memory loss and substance abuse. The patient does not have insomnia.    Allergic/Immunologic: Negative for HIV exposure and hives.       Objective:   Physical Exam  HENT:      Head: Normocephalic.   Eyes:      Pupils: Pupils are equal, round, and reactive to light.   Neck:      Thyroid: No thyromegaly.      Vascular: Normal carotid pulses. No carotid bruit or JVD.   Cardiovascular:      Rate and Rhythm: Normal rate and regular rhythm.  No extrasystoles are present.     Chest Wall: PMI is not displaced.      Pulses: Normal pulses.      Heart sounds: Normal heart sounds. No murmur heard.    No gallop. No S3 sounds.   Pulmonary:      Effort: No respiratory distress.      Breath sounds: Normal breath sounds. No stridor.   Abdominal:      General: Bowel sounds are normal.      Palpations: Abdomen is soft.      Tenderness: There is no abdominal tenderness. There is no rebound.   Musculoskeletal:         General: Normal range of motion.   Skin:     Findings: No rash.   Neurological:      Mental Status: She is alert and oriented to person, place, and time.   Psychiatric:         Behavior: Behavior normal.         Lab Results   Component Value Date    CHOL 228 (H) 06/29/2015    CHOL 175 11/18/2014    CHOL 174 07/16/2014     Lab Results   Component Value Date    HDL 41 06/29/2015    HDL 36 (L) 11/18/2014    HDL 38 (L) 07/16/2014      Lab Results   Component Value Date    LDLCALC 156.0 06/29/2015    LDLCALC 115.8 11/18/2014    LDLCALC 111.2 07/16/2014     Lab Results   Component Value Date    TRIG 155 (H) 06/29/2015    TRIG 116 11/18/2014    TRIG 124 07/16/2014     Lab Results   Component Value Date    CHOLHDL 18.0 (L) 06/29/2015    CHOLHDL 20.6 11/18/2014    CHOLHDL 21.8 07/16/2014       Chemistry        Component Value Date/Time     06/29/2015 1350    K 4.2 06/29/2015 1350     06/29/2015 1350    CO2 31 (H) 06/29/2015 1350    BUN 13 06/29/2015 1350    CREATININE 1.1 06/29/2015 1350     06/29/2015 1350        Component Value Date/Time    CALCIUM 9.8 06/29/2015 1350    ALKPHOS 80 06/29/2015 1350    AST 21 06/29/2015 1350    ALT 18 06/29/2015 1350    BILITOT 0.5 06/29/2015 1350    ESTGFRAFRICA >60.0 06/29/2015 1350    EGFRNONAA 55.1 (A) 06/29/2015 1350          No results found for: LABA1C, HGBA1C  Lab Results   Component Value Date    TSH 2.534 06/29/2015     No results found for: INR, PROTIME  Lab Results   Component Value Date    WBC 4.32 (L) 05/24/2013    HGB 14.2 05/24/2013    HCT 44.7 05/24/2013    MCV 90.3 05/24/2013     05/24/2013     BMP  Sodium   Date Value Ref Range Status   06/29/2015 141 136 - 145 mmol/L Final     Potassium   Date Value Ref Range Status   06/29/2015 4.2 3.5 - 5.1 mmol/L Final     Chloride   Date Value Ref Range Status   06/29/2015 105 95 - 110 mmol/L Final     CO2   Date Value Ref Range Status   06/29/2015 31 (H) 23 - 29 mmol/L Final     BUN   Date Value Ref Range Status   06/29/2015 13 6 - 20 mg/dL Final     Creatinine   Date Value Ref Range Status   06/29/2015 1.1 0.5 - 1.4 mg/dL Final     Calcium   Date Value Ref Range Status   06/29/2015 9.8 8.7 - 10.5 mg/dL Final     Anion Gap   Date Value Ref Range Status   06/29/2015 5 (L) 8 - 16 mmol/L Final     eGFR if    Date Value Ref Range Status   06/29/2015 >60.0 >60 mL/min/1.73 m^2 Final     eGFR if non African American    Date Value Ref Range Status   06/29/2015 55.1 (A) >60 mL/min/1.73 m^2 Final     Comment:     Calculation used to obtain the estimated glomerular filtration  rate (eGFR) is the CKD-EPI equation. Since race is unknown   in our information system, the eGFR values for   -American and Non--American patients are given   for each creatinine result.       BNP  @LABRCNTIP(BNP,BNPTRIAGEBLO)@  @LABRCNTIP(troponini)@  CrCl cannot be calculated (Patient's most recent lab result is older than the maximum 7 days allowed.).  No results found in the last 24 hours.  No results found in the last 24 hours.  No results found in the last 24 hours.    Assessment:      1. Essential hypertension    2. Mixed hyperlipidemia    3. PVD (peripheral vascular disease)        Plan:   Contiune ASA statin amlodipine metoprolol lisinopril and HCTZ    Counseled DASH  Check Lipid profile in 6 months  Recommend heart-healthy diet, weight control and regular exercise.  Hitesh. Risk modification.   I have reviewed all pertinent labs and cardiac studies independently. Plans and recommendations have been formulated under my direct supervision. All questions answered and patient voiced understanding.   If symptoms persist go to the ED  RTC in 12 months

## 2022-04-12 ENCOUNTER — HOSPITAL ENCOUNTER (OUTPATIENT)
Dept: RADIOLOGY | Facility: HOSPITAL | Age: 67
Discharge: HOME | End: 2022-04-12
Attending: INTERNAL MEDICINE
Payer: MEDICARE

## 2022-04-12 ENCOUNTER — OFFICE VISIT (OUTPATIENT)
Dept: OBSTETRICS AND GYNECOLOGY | Facility: CLINIC | Age: 67
End: 2022-04-12
Payer: MEDICARE

## 2022-04-12 VITALS
DIASTOLIC BLOOD PRESSURE: 82 MMHG | HEIGHT: 68 IN | BODY MASS INDEX: 32.34 KG/M2 | WEIGHT: 213.38 LBS | SYSTOLIC BLOOD PRESSURE: 136 MMHG

## 2022-04-12 DIAGNOSIS — Z12.31 ENCOUNTER FOR SCREENING MAMMOGRAM FOR MALIGNANT NEOPLASM OF BREAST: ICD-10-CM

## 2022-04-12 DIAGNOSIS — Z01.419 ENCOUNTER FOR GYNECOLOGICAL EXAMINATION WITHOUT ABNORMAL FINDING: Primary | ICD-10-CM

## 2022-04-12 PROCEDURE — 99999 PR PBB SHADOW E&M-EST. PATIENT-LVL IV: CPT | Mod: PBBFAC,,, | Performed by: NURSE PRACTITIONER

## 2022-04-12 PROCEDURE — 77063 BREAST TOMOSYNTHESIS BI: CPT | Mod: 26,,, | Performed by: RADIOLOGY

## 2022-04-12 PROCEDURE — 99214 OFFICE O/P EST MOD 30 MIN: CPT | Mod: PBBFAC | Performed by: NURSE PRACTITIONER

## 2022-04-12 PROCEDURE — G0101 CA SCREEN;PELVIC/BREAST EXAM: HCPCS | Mod: S$PBB,,, | Performed by: NURSE PRACTITIONER

## 2022-04-12 PROCEDURE — 77063 BREAST TOMOSYNTHESIS BI: CPT | Mod: TC

## 2022-04-12 PROCEDURE — 77067 SCR MAMMO BI INCL CAD: CPT | Mod: TC

## 2022-04-12 PROCEDURE — 77067 SCR MAMMO BI INCL CAD: CPT | Mod: 26,,, | Performed by: RADIOLOGY

## 2022-04-12 PROCEDURE — G0101 CA SCREEN;PELVIC/BREAST EXAM: HCPCS | Mod: PBBFAC | Performed by: NURSE PRACTITIONER

## 2022-04-12 PROCEDURE — 77067 MAMMO DIGITAL SCREENING BILAT WITH TOMO: ICD-10-PCS | Mod: 26,,, | Performed by: RADIOLOGY

## 2022-04-12 PROCEDURE — G0101 PR CA SCREEN;PELVIC/BREAST EXAM: ICD-10-PCS | Mod: S$PBB,,, | Performed by: NURSE PRACTITIONER

## 2022-04-12 PROCEDURE — 99999 PR PBB SHADOW E&M-EST. PATIENT-LVL IV: ICD-10-PCS | Mod: PBBFAC,,, | Performed by: NURSE PRACTITIONER

## 2022-04-12 PROCEDURE — 77063 MAMMO DIGITAL SCREENING BILAT WITH TOMO: ICD-10-PCS | Mod: 26,,, | Performed by: RADIOLOGY

## 2022-04-12 RX ORDER — LORATADINE 10 MG/1
10 TABLET ORAL DAILY
COMMUNITY
Start: 2022-04-04

## 2022-04-12 RX ORDER — LORATADINE 10 MG
TABLET,CHEWABLE ORAL
COMMUNITY

## 2022-04-12 RX ORDER — LATANOPROST 50 UG/ML
SOLUTION/ DROPS OPHTHALMIC
COMMUNITY

## 2022-04-12 RX ORDER — DICLOFENAC SODIUM 75 MG/1
TABLET, DELAYED RELEASE ORAL
COMMUNITY

## 2022-04-12 NOTE — PROGRESS NOTES
"CC: Well woman exam    Caro Purvis is a 66 y.o. female  presents for well woman exam.  LMP: Patient's last menstrual period was 07/15/2013..  No issues, problems, or complaints.  mmg done today  Pap and HPV negative for 2021      Past Medical History:   Diagnosis Date    Essential hypertension, benign     Obesity     Other and unspecified hyperlipidemia     Uveitis      Past Surgical History:   Procedure Laterality Date    COLONOSCOPY      TUBAL LIGATION       Social History     Socioeconomic History    Marital status:      Spouse name: Jerardo    Number of children: 4   Occupational History    Occupation: CNA/Student   Tobacco Use    Smoking status: Never Smoker    Smokeless tobacco: Never Used   Substance and Sexual Activity    Alcohol use: No    Drug use: No    Sexual activity: Not Currently     Partners: Male     Comment:      Family History   Problem Relation Age of Onset    Hypertension Mother     Hypertension Father     Cataracts Father     Hypertension Sister     Hypertension Brother     Hypertension Sister     Hypertension Brother     Hypertension Brother     Hypertension Brother     Hypertension Brother     Breast cancer Neg Hx     Ovarian cancer Neg Hx     Deep vein thrombosis Neg Hx      OB History        8    Para   8    Term   4            AB        Living   4       SAB        IAB        Ectopic        Multiple        Live Births                     /82   Ht 5' 8" (1.727 m)   Wt 96.8 kg (213 lb 6.5 oz)   LMP 07/15/2013   BMI 32.45 kg/m²       ROS:  GENERAL: Denies weight gain or weight loss. Feeling well overall.   SKIN: Denies rash or lesions.   HEAD: Denies head injury or headache.   NODES: Denies enlarged lymph nodes.   CHEST: Denies chest pain or shortness of breath.   CARDIOVASCULAR: Denies palpitations or left sided chest pain.   ABDOMEN: No abdominal pain, constipation, diarrhea, nausea, vomiting or rectal bleeding. "   URINARY: No frequency, dysuria, hematuria, or burning on urination.  REPRODUCTIVE: See HPI.   BREASTS: The patient performs breast self-examination and denies pain, lumps, or nipple discharge.   HEMATOLOGIC: No easy bruisability or excessive bleeding.   MUSCULOSKELETAL: Denies joint pain or swelling.   NEUROLOGIC: Denies syncope or weakness.   PSYCHIATRIC: Denies depression, anxiety or mood swings.    PHYSICAL EXAM:  APPEARANCE: Well nourished, well developed, in no acute distress.  AFFECT: WNL, alert and oriented x 3  SKIN: No acne or hirsutism  NECK: Neck symmetric without masses or thyromegaly  NODES: No inguinal, cervical, axillary, or femoral lymph node enlargement  CHEST: Good respiratory effect  ABDOMEN: Soft.  No tenderness or masses.  No hepatosplenomegaly.  No hernias.  BREASTS: Symmetrical, no skin changes or visible lesions.  No palpable masses, nipple discharge bilaterally.  PELVIC: Normal external genitalia without lesions.  Normal hair distribution.  Adequate perineal body, normal urethral meatus.  Vagina moist and well rugated without lesions or discharge.  Cervix pink, without lesions, discharge or tenderness.  No significant cystocele or rectocele - mild vaginal laxity and prolapse.  Bimanual exam shows uterus to be normal size, regular, mobile and nontender.  Adnexa without masses or tenderness.    EXTREMITIES: No edema.  Physical Exam    1. Encounter for gynecological examination without abnormal finding      AND PLAN:    Patient was counseled today on A.C.S. Pap guidelines and recommendations for yearly pelvic exams, mammograms and monthly self breast exams; to see her PCP for other health maintenance.

## 2023-03-13 ENCOUNTER — PATIENT MESSAGE (OUTPATIENT)
Dept: PAIN MEDICINE | Facility: CLINIC | Age: 68
End: 2023-03-13
Payer: MEDICAID

## 2023-10-23 ENCOUNTER — TELEPHONE (OUTPATIENT)
Dept: OBSTETRICS AND GYNECOLOGY | Facility: CLINIC | Age: 68
End: 2023-10-23
Payer: MEDICAID

## 2023-10-23 DIAGNOSIS — Z12.31 ENCOUNTER FOR SCREENING MAMMOGRAM FOR MALIGNANT NEOPLASM OF BREAST: Primary | ICD-10-CM

## 2023-10-23 NOTE — TELEPHONE ENCOUNTER
----- Message from Mary Chadwick sent at 10/23/2023  3:19 PM CDT -----  Caller is requesting to schedule their annual mammogram appointment.  Order is not listed in EPIC.  Please enter order and contact patient to schedule.    Name of Caller:CARIDAD MUJICA [[id number]]      Where would they like the mammogram performed?[facility]      Best Call Back Number:.Telephone Information:  [employer]          [phone]          Additional Information:Please call once order had been placed. Thank you

## 2023-10-23 NOTE — TELEPHONE ENCOUNTER
----- Message from Mary Chadwick sent at 10/23/2023  3:17 PM CDT -----  Name of Who is Calling:CARIDAD MUJICA [[id number]]        What is the request in detail:Pt is requesting a call back from the office in regards to getting an appt for [facility].Please advise thank you        Can the clinic reply by [facility]:NO         What Number to Call Back if not in [facility]:.Telephone Information:  [employer]          [phone]

## 2023-12-05 ENCOUNTER — HOSPITAL ENCOUNTER (OUTPATIENT)
Dept: RADIOLOGY | Facility: HOSPITAL | Age: 68
Discharge: HOME | End: 2023-12-05
Attending: NURSE PRACTITIONER
Payer: MEDICARE

## 2023-12-05 VITALS — BODY MASS INDEX: 32.34 KG/M2 | WEIGHT: 213.38 LBS | HEIGHT: 68 IN

## 2023-12-05 DIAGNOSIS — Z12.31 ENCOUNTER FOR SCREENING MAMMOGRAM FOR MALIGNANT NEOPLASM OF BREAST: ICD-10-CM

## 2023-12-05 PROCEDURE — 77063 MAMMO DIGITAL SCREENING BILAT WITH TOMO: ICD-10-PCS | Mod: 26,,, | Performed by: RADIOLOGY

## 2023-12-05 PROCEDURE — 77063 BREAST TOMOSYNTHESIS BI: CPT | Mod: 26,,, | Performed by: RADIOLOGY

## 2023-12-05 PROCEDURE — 77067 SCR MAMMO BI INCL CAD: CPT | Mod: 26,,, | Performed by: RADIOLOGY

## 2023-12-05 PROCEDURE — 77067 SCR MAMMO BI INCL CAD: CPT | Mod: TC

## 2023-12-05 PROCEDURE — 77067 MAMMO DIGITAL SCREENING BILAT WITH TOMO: ICD-10-PCS | Mod: 26,,, | Performed by: RADIOLOGY

## 2025-07-22 ENCOUNTER — PATIENT MESSAGE (OUTPATIENT)
Dept: RESEARCH | Facility: HOSPITAL | Age: 70
End: 2025-07-22
Payer: MEDICARE